# Patient Record
Sex: FEMALE | URBAN - METROPOLITAN AREA
[De-identification: names, ages, dates, MRNs, and addresses within clinical notes are randomized per-mention and may not be internally consistent; named-entity substitution may affect disease eponyms.]

---

## 2023-03-21 ENCOUNTER — APPOINTMENT (OUTPATIENT)
Dept: URBAN - METROPOLITAN AREA CLINIC 210 | Age: 63
Setting detail: DERMATOLOGY
End: 2023-03-21

## 2023-03-21 PROBLEM — C44.319 BASAL CELL CARCINOMA OF SKIN OF OTHER PARTS OF FACE: Status: ACTIVE | Noted: 2023-03-21

## 2023-03-21 PROCEDURE — OTHER CONSULTATION FOR MOHS SURGERY: OTHER

## 2023-03-21 PROCEDURE — OTHER COUNSELING: OTHER

## 2023-03-21 PROCEDURE — 99203 OFFICE O/P NEW LOW 30 MIN: CPT

## 2023-03-21 NOTE — HPI: MOHS SURGERY CONSULTATION
Has The Cancer Been Biopsied Before?: has been previously biopsied
Body Location Override (Optional): right superior forehead
Who Is Your Referring Provider?: Bryant Smith MD
When Was Your Biopsy?: 2-3-2023

## 2023-05-04 ENCOUNTER — APPOINTMENT (OUTPATIENT)
Dept: URBAN - METROPOLITAN AREA CLINIC 210 | Age: 63
Setting detail: DERMATOLOGY
End: 2023-05-04

## 2023-05-04 PROBLEM — C44.319 BASAL CELL CARCINOMA OF SKIN OF OTHER PARTS OF FACE: Status: ACTIVE | Noted: 2023-05-04

## 2023-05-04 PROCEDURE — 13132 CMPLX RPR F/C/C/M/N/AX/G/H/F: CPT

## 2023-05-04 PROCEDURE — OTHER MOHS SURGERY: OTHER

## 2023-05-04 PROCEDURE — 17311 MOHS 1 STAGE H/N/HF/G: CPT

## 2023-05-04 PROCEDURE — 17312 MOHS ADDL STAGE: CPT

## 2023-05-04 NOTE — PROCEDURE: MOHS SURGERY
Offered and patient declined Home Suture Removal Text: Patient was provided instructions on removing sutures and will remove their sutures at home.  If they have any questions or difficulties they will call the office.

## 2023-05-04 NOTE — PROCEDURE: MOHS SURGERY
Return in about 2 months (around 2/5/2023) for medication check.  followup 2 months for MWV  Problems Addressed    1.) Do you have an Advance directive, living will, or power of  for health care document that contains your wishes for end of life care?: No     3.) During the past 4 weeks, how would you rate your health?: Fair     6 b.) How many servings of High Fiber / Whole Grain Foods to you have each day ( 1 serving = 1 cup cold cereal, 1/2 cup cooked cereal, 1 slice bread): 1 per day     7a.) Have you had a fall in the past year?: Yes     7b.) Do you feel unsteady when standing or walking?: Yes     7c.) Do you worry about falling?: Yes     11c.) Teeth or Denture Problems: Often     11d.) Bodily pain: Often     11e.) Tiredness or Fatigue: Often     12.) Do you need help with any of the following activities?   (check all that apply): Bathing, Dressing and grooming, Using the Toilet, Getting in and out of bed or chairs     13.) Do you need help with any of the following activities?: Get to places outside of walking distance (can't drive alone, or take a bus/taxi alone), Go shopping for groceries or clothes, Do your housework or laundry, Prepare a meal     15.) How confident are you that you can control and manage most of your health problems?: Somewhat confident     Dexa-follows with endocrinologist  Colonoscopy-due  Eye-8/22  Pap normal 4/2017  CT lung cancer-10/2022  Mammo-ordered but needs to be done  Vaccines-n/a    Hx of colitis  -states that she has been eating and drinking  -no nausea  +constipation (which she feels is her normal)  -had oatmeal, muffin today  Assessment and Plan:   Overall improved from hospitalization. Will continue to monitor    Abnormal PHQ9  Assessment and Plan:   Discussed with pt that the score in the moderate depressive range. Pt feels like the majority of her symptoms are situational and therefore unlikely to results in improvement on medication. If in the future she would  like me to start something though I would be happy to do so w/o an appt (but would then have her f/u in a couple week). She has failed cymbalta so would need to avoid that. Could consider low dose lexapro.     Unsteadiness  -feels like it has been going on for a month; does not feel like they are moving like they should; feet feel numb  -has been having PT come out to the house (after admission in 10/31)  -lumbar MRI done 9/2022 (foraml stenosis, l3-s1 variable spondylosis and facet arthrosis)  -no current dizziness or lightheadedness  Assessment and Plan:   Likely a combination of her myelopathy and deconditioning from her recent hospitalization.     Cervical myelopathy  -follows at [facility] [place][note 2]; recommended surgery (currently needs tx for osteoporosis)   -pt states that her hands burn and sting; pt was given gabapentin by neurology. However chart review shows that she was on this in the past but did not tolerate it (sedation); this was d/c at last visit and switched to lyrica  -has been on cymbalta in the past; does not want to try  -EMG done 11/2022 showed mild carpal tunnel syndrome and cervical radiculopathy; recommended using carpal tunnel braces  -pt states that she is planning on getting surgery at [facility] [place][note 2] (last saw them in June; states that she will contact them in January)  -pt states that the lyrica is helpful for her pain  Assessment and Plan:   Pt will f/u with spine at [facility] [place][note 2]. Will continue on lyrica     Prediabetes  Hemoglobin A1C (%)   Date Value   06/13/2022 6.1 (H)   -does not tolerate metformin  Assessment and Plan:   Declined a1c today      HTN/CAD s/p CABG/Stable angina  -follows with cards at [facility] [place][note 2]  -metoprolol, hydralazine  -previously on imdur (cards offered 30 mg Imdur at bedtime last visit but pt declined)  -stress test 5/2019     Osteoporosis  -has been referred to endo (did not see) but now following at [facility] [place][note 2]  -was not compliant with alendronate  -seen in Bone health clinic at  [facility], per note from 3/29/22: -Would like patient to be on adequate calcium prior to initiating treatment. With her history of stroke and heart attack she would not be an ideal candidate for romosozumab (Evenity). I would be very concerned with compliance with anabolic therapy with teriparatide or abaloparatide, as they are daily injections. Oral bisphosphonate therapy would not be ideal given her history of GERD and noncompliance with alendronate. She may be a candidate for a zoledronic acid (Reclast) infusion. We discussed the rare side effects of AFF and ONJ. Discussed that she needs to show that she can be taking calcium for at least 4 weeks prior to having the infusion ordered. She is aware that surgery will need to be delayed until 4 weeks after her Reclast infusion.  -follows with endo at [facility]  -got reclast infusion in May    COPD/Asthma  -was not compliant on meds at last visit and was overdue for f/u at pulm clinic at [facility]  -pt states that her breathing has been ok  -not taking been taking anoro; taking the albuterol as needed   Assessment and Plan:   Pt will f/u with lung doctor    GERD  -pantoprazole     Vit D def        Vitamin D, 25-Hydroxy (ng/mL)   Date Value   11/15/2021 39.4      Mass of parotid  -follows with ENT  -saw 5/18/22    Dyslipidemia  -crestor  -has been taking medication  -lipid panel done 6/2022     Constipation  -offered GI referral but pt declined  -has been taking miralax; does not feel like like it is helping  Assessment and Plan:   Offered referral to GI. Pt agreeable at this time (had declined previously)      IgG kappa monoclonal protein on [place] (done as part of workup for osteoporosis)  -saw hematology 4/3/22 (econsult) with the following recommendation: MGUS is risk stratified in terms of evolution to a plasma cell disorder based on the presence of risk features: non IgG MGUS, abnormal light chain ratio and monoclonal peak > 1.5 g/dL. The clinical scenario is  compatible with low-risk MGUS (no risk factors).     MGUS is present in approximately 1-2% of the population. It is more common as people age. The concern is that it poses a risk of progression to multiple myeloma. In cases of low-risk MGUS, this risk is small, about 5% over 20 years. In cases of low-risk MGUS, we typically do not obtain a skeletal survey or a bone marrow biopsy. I would recommend follow up with SPEP with [place], serum free light chain assay, CBC with differential, calcium, and serum creatinine in 6 months. If there is no substantive change at that point, testing can be spaced to every 2-3 years. They should be referred to Benign Hematology if there is a rise in their M-protein (to detectable and subsequently more than 50% increase) or if they develop signs or symptoms that are concerning for unexplained hypercalcemia, renal insufficiency, anemia or a pathologic fracture.   Assessment and Plan:   Pt declines doing labs today. Will order at next visit         Subjective  Chief Complaint   Patient presents with   • Medicare Wellness Visit       ?    History  Past Medical History:   Diagnosis Date   • Arthritis    • AVM (arteriovenous malformation) of colon without hemorrhage 2014   • Chronic back pain 2014   • Chronic ischemic heart disease, unspecified 2014   • Colon polyps 2017    5 yr recall, tubular adenoma / Dr. Lord   • Coronary artery disease    • Esophageal reflux    • Essential (primary) hypertension    • Glaucoma     right eye   • Old myocardial infarction    • Other and unspecified hyperlipidemia    • Plantar fasciitis    • PONV (postoperative nausea and vomiting)    • Stroke (cerebrum) (CMS/HCC)      Past Surgical History:   Procedure Laterality Date   • APPENDECTOMY     • CARDIAC SURGERY      triple bypass   •  SECTION, CLASSIC     • COLON SURGERY     • COLONOSCOPY REMOVE LESIONS BY SNARE  14    rpt 2 yrs,A-colon mass>surgery w/  Devika,tubulovillous x1,Dr. Lord.    • COLONOSCOPY REMOVE LESIONS BY SNARE  09/13/2017    Colon in 5 years,adenoma x2,Dr. Lord   • ESOPHAGOGASTRODUODENOSCOPY TRANSORAL FLEX DIAG  05/04/14    GERD. Pain. No lesions. Dr. Lord   • EYE SURGERY  left eye enucleation   • RIGHT COLECTOMY  05/05/2014    Colon resection - Dr Fortune - Colon ulcerations       Medications  Current Outpatient Medications   Medication Sig Dispense Refill   • erythromycin (ILOTYCIN) ophthalmic ointment      • fluticasone (FLONASE) 50 MCG/ACT nasal spray 2 sprays.     • lidocaine (XYLOCAINE) 5 % ointment Apply topically 4 times daily as needed for Pain. 35.44 g 0   • pregabalin (Lyrica) 50 MG capsule Take 1 capsule by mouth in the morning and 1 capsule at noon and 1 capsule in the evening. 90 capsule 1   • hydrALAZINE (APRESOLINE) 50 MG tablet Take 1 tablet by mouth in the morning and 1 tablet in the evening. 180 tablet 3   • aspirin 81 MG EC tablet Take 1 tablet by mouth daily. 90 tablet 3   • metoPROLOL succinate (TOPROL-XL) 100 MG 24 hr tablet Take 1 tablet by mouth in the morning and 1 tablet in the evening. 180 tablet 3   • rosuvastatin (CRESTOR) 10 MG tablet Take 1 tablet by mouth daily. 90 tablet 3   • omeprazole (PrilOSEC) 20 MG capsule Take 1 capsule by mouth daily. 90 capsule 1   • albuterol (Ventolin HFA) 108 (90 Base) MCG/ACT inhaler INHALE 2 PUFFS BY MOUTH EVERY 4 HOURS AS NEEDED FOR COUGH OR SHORTNESS OF BREATH OR WHEEZING 18 g 2   • loratadine (CLARITIN) 10 MG tablet Take 1 tablet by mouth daily. 90 tablet 3   • calcium carbonate, antacid, 1000 MG chewable tablet Chew 1,000 mg by mouth.     • nitroGLYcerin (NITROSTAT) 0.4 MG sublingual tablet Place 0.4 mg under the tongue every 5 minutes as needed.     • umeclidinium-vilanterol (Anoro Ellipta) 62.5-25 MCG/INH inhaler Inhale 1 puff into the lungs daily. 60 each 12   • Blood Pressure Kit Use for home monitoring 1 kit 0   • brimonidine (ALPHAGAN) 0.2 %  ophthalmic solution Place 1 drop into right eye 2 times daily. 5 mL 12   • latanoprost (XALATAN) 0.005 % ophthalmic solution Place 1 drop into right eye nightly.       No current facility-administered medications for this visit.     Facility-Administered Medications Ordered in Other Visits   Medication   • barium sulfate 2 % suspension 750 mL         Exam  Visit Vitals  /72   Pulse 67   Temp 97.4 °F (36.3 °C) (Tympanic)   Resp 16   Ht 5' (1.524 m)   Wt 49.9 kg (110 lb)   LMP  (LMP Unknown)   SpO2 97%   BMI 21.48 kg/m²       Wt Readings from Last 4 Encounters:   12/05/22 49.9 kg (110 lb)   09/14/22 49.8 kg (109 lb 11.2 oz)   06/13/22 50.3 kg (111 lb)   05/18/22 50.8 kg (112 lb)     BP Readings from Last 4 Encounters:   12/05/22 133/72   09/14/22 116/60   09/12/22 (!) 140/96   06/13/22 128/62       Physical Exam  HENT:      Head: Normocephalic.   Cardiovascular:      Rate and Rhythm: Normal rate and regular rhythm.   Pulmonary:      Effort: Pulmonary effort is normal.   Abdominal:      Palpations: Abdomen is soft.   Musculoskeletal:         General: No swelling.   Neurological:      Mental Status: She is alert. Mental status is at baseline.                  MEDICARE WELLNESS VISIT NOTE    HISTORY OF PRESENT ILLNESS:   Puja Ortega presents for her Subsequent Annual Medicare Wellness Visit.   She has complaints or concerns which include those outlined above.      Patient Care Team:  Melissa Ward MD as PCP - General (Family Practice)        Patient Active Problem List   Diagnosis   • Chronic back pain   • Chronic ischemic heart disease, unspecified   • Other and unspecified hyperlipidemia   • Cervical spondylosis   • GERD (gastroesophageal reflux disease)   • Pulmonary nodule, right   • Osteoarthritis of spine with radiculopathy, cervical region   • Spinal stenosis   • CAD (coronary artery disease)   • Asthma with COPD (CMS/[facility])   • Osteoporosis   • Essential hypertension   • Prediabetes   • MGUS (monoclonal  gammopathy of unknown significance)   • History of stroke         Past Medical History:   Diagnosis Date   • Arthritis    • AVM (arteriovenous malformation) of colon without hemorrhage 2014   • Chronic back pain 2014   • Chronic ischemic heart disease, unspecified 2014   • Colon polyps 2017    5 yr recall, tubular adenoma / Dr. Lord   • Coronary artery disease    • Esophageal reflux    • Essential (primary) hypertension    • Glaucoma     right eye   • Old myocardial infarction    • Other and unspecified hyperlipidemia    • Plantar fasciitis    • PONV (postoperative nausea and vomiting)    • Stroke (cerebrum) (CMS/HCC)          Past Surgical History:   Procedure Laterality Date   • Appendectomy     • Cardiac surgery      triple bypass   •  section, classic     • Colon surgery     • Colonoscopy remove lesions by snare  14    rpt 2 yrs,A-colon mass>surgery w/Dr. Fortune,tubulovillous x1,Dr. Lord.    • Colonoscopy remove lesions by snare  2017    Colon in 5 years,adenoma x2,Dr. Lord   • Esophagogastroduodenoscopy transoral flex diag  14    GERD. Pain. No lesions. Dr. Lord   • Eye surgery  left eye enucleation   • Right colectomy  2014    Colon resection - Dr Fortune - Colon ulcerations         Social History     Tobacco Use   • Smoking status: Former Smoker     Packs/day: 0.50     Years: 48.00     Pack years: 24.00     Types: Cigarettes     Quit date: 2019     Years since quitting: 3.9   • Smokeless tobacco: Never Used   • Tobacco comment: 4-6 cigarettes per day   Substance Use Topics   • Alcohol use: No     Alcohol/week: 0.0 standard drinks   • Drug use: No     Drug use:    Drug Use:    No              Family History   Problem Relation Age of Onset   • Cancer Father         throat cancer   • Cancer, Lung Sister    • Cancer, Colon Brother    • Heart Mother    • Patient is unaware of any medical problems Sister    • Patient is  unaware of any medical problems Sister    • Patient is unaware of any medical problems Sister    • Patient is unaware of any medical problems Sister    • Patient is unaware of any medical problems Brother    • Patient is unaware of any medical problems Brother    • Patient is unaware of any medical problems Brother        Current Outpatient Medications   Medication Sig Dispense Refill   • erythromycin (ILOTYCIN) ophthalmic ointment      • fluticasone (FLONASE) 50 MCG/ACT nasal spray 2 sprays.     • lidocaine (XYLOCAINE) 5 % ointment Apply topically 4 times daily as needed for Pain. 35.44 g 0   • pregabalin (Lyrica) 50 MG capsule Take 1 capsule by mouth in the morning and 1 capsule at noon and 1 capsule in the evening. 90 capsule 1   • hydrALAZINE (APRESOLINE) 50 MG tablet Take 1 tablet by mouth in the morning and 1 tablet in the evening. 180 tablet 3   • aspirin 81 MG EC tablet Take 1 tablet by mouth daily. 90 tablet 3   • metoPROLOL succinate (TOPROL-XL) 100 MG 24 hr tablet Take 1 tablet by mouth in the morning and 1 tablet in the evening. 180 tablet 3   • rosuvastatin (CRESTOR) 10 MG tablet Take 1 tablet by mouth daily. 90 tablet 3   • omeprazole (PrilOSEC) 20 MG capsule Take 1 capsule by mouth daily. 90 capsule 1   • albuterol (Ventolin HFA) 108 (90 Base) MCG/ACT inhaler INHALE 2 PUFFS BY MOUTH EVERY 4 HOURS AS NEEDED FOR COUGH OR SHORTNESS OF BREATH OR WHEEZING 18 g 2   • loratadine (CLARITIN) 10 MG tablet Take 1 tablet by mouth daily. 90 tablet 3   • calcium carbonate, antacid, 1000 MG chewable tablet Chew 1,000 mg by mouth.     • nitroGLYcerin (NITROSTAT) 0.4 MG sublingual tablet Place 0.4 mg under the tongue every 5 minutes as needed.     • umeclidinium-vilanterol (Anoro Ellipta) 62.5-25 MCG/INH inhaler Inhale 1 puff into the lungs daily. 60 each 12   • Blood Pressure Kit Use for home monitoring 1 kit 0   • brimonidine (ALPHAGAN) 0.2 % ophthalmic solution Place 1 drop into right eye 2 times daily. 5 mL 12   •  latanoprost (XALATAN) 0.005 % ophthalmic solution Place 1 drop into right eye nightly.       No current facility-administered medications for this visit.     Facility-Administered Medications Ordered in Other Visits   Medication Dose Route Frequency Provider Last Rate Last Admin   • barium sulfate 2 % suspension 750 mL  750 mL Oral Once Aron Henderson MD            The following items on the Medicare Health Risk Assessment were found to be positive  1.) Do you have an Advance directive, living will, or power of  for health care document that contains your wishes for end of life care?: No     3.) During the past 4 weeks, how would you rate your health?: Fair     6 b.) How many servings of High Fiber / Whole Grain Foods to you have each day ( 1 serving = 1 cup cold cereal, 1/2 cup cooked cereal, 1 slice bread): 1 per day     7a.) Have you had a fall in the past year?: Yes     7b.) Do you feel unsteady when standing or walking?: Yes     7c.) Do you worry about falling?: Yes     11c.) Teeth or Denture Problems: Often     11d.) Bodily pain: Often     11e.) Tiredness or Fatigue: Often     12.) Do you need help with any of the following activities?   (check all that apply): Bathing, Dressing and grooming, Using the Toilet, Getting in and out of bed or chairs     13.) Do you need help with any of the following activities?: Get to places outside of walking distance (can't drive alone, or take a bus/taxi alone), Go shopping for groceries or clothes, Do your housework or laundry, Prepare a meal     15.) How confident are you that you can control and manage most of your health problems?: Somewhat confident         Vision and Hearing screens: No exam data present    Advance Directive:   The patient has the following documents:  No Advance Directives on file. Patient offered documents.    Cognitive/Functional Status: preexisting cognitive issues - stable    Opioid Review: Puja is not taking opioid  medications.    Recent PHQ 2/9 Score:    PHQ 2:  Date Adult PHQ 2 Score Adult PHQ 2 Interpretation   12/5/2022 0 No further screening needed       PHQ 9:  Date Adult PHQ 9 Score Adult PHQ 9 Interpretation   6/13/2022 13 Moderate Depression       DEPRESSION ASSESSMENT/PLAN:  Mild symptoms, will monitor and reevaluate.     Body mass index is 21.48 kg/m².    BMI ASSESSMENT/PLAN:  Patient BMI is within normal range.     Needed Screening/Treatment:   Annual mammogram , colonoscopy  Needed follow up:  None    See orders.   See Patient Instructions section.   Return in about 2 months (around 2/5/2023) for medication check.     Consent (Spinal Accessory)/Introductory Paragraph: The rationale for Mohs was explained to the patient and consent was obtained. The risks, benefits and alternatives to therapy were discussed in detail. Specifically, the risks of damage to the spinal accessory nerve, infection, scarring, bleeding, prolonged wound healing, incomplete removal, allergy to anesthesia, and recurrence were addressed. Prior to the procedure, the treatment site was clearly identified and confirmed by the patient. All components of Universal Protocol/PAUSE Rule completed.

## 2025-07-05 NOTE — PROCEDURE: MOHS SURGERY
Medical Assessment Completed on: 05-Jul-2025 09:17 Brow Lift Text: A midfrontal incision was made medially to the defect to allow access to the tissues just superior to the left eyebrow. Following careful dissection inferiorly in a supraperiosteal plane to the level of the left eyebrow, several 3-0 monocryl sutures were used to resuspend the eyebrow orbicularis oculi muscular unit to the superior frontal bone periosteum. This resulted in an appropriate reapproximation of static eyebrow symmetry and correction of the left brow ptosis.